# Patient Record
Sex: FEMALE | HISPANIC OR LATINO | URBAN - METROPOLITAN AREA
[De-identification: names, ages, dates, MRNs, and addresses within clinical notes are randomized per-mention and may not be internally consistent; named-entity substitution may affect disease eponyms.]

---

## 2023-06-06 ENCOUNTER — TELEPHONE ENCOUNTER (OUTPATIENT)
Dept: URBAN - METROPOLITAN AREA CLINIC 113 | Facility: CLINIC | Age: 43
End: 2023-06-06

## 2023-06-06 ENCOUNTER — LAB OUTSIDE AN ENCOUNTER (OUTPATIENT)
Dept: URBAN - METROPOLITAN AREA CLINIC 113 | Facility: CLINIC | Age: 43
End: 2023-06-06

## 2023-06-06 ENCOUNTER — OFFICE VISIT (OUTPATIENT)
Dept: URBAN - METROPOLITAN AREA CLINIC 113 | Facility: CLINIC | Age: 43
End: 2023-06-06
Payer: COMMERCIAL

## 2023-06-06 VITALS
TEMPERATURE: 97.3 F | BODY MASS INDEX: 28.03 KG/M2 | HEART RATE: 74 BPM | RESPIRATION RATE: 18 BRPM | SYSTOLIC BLOOD PRESSURE: 117 MMHG | WEIGHT: 158.2 LBS | DIASTOLIC BLOOD PRESSURE: 84 MMHG | HEIGHT: 63 IN

## 2023-06-06 DIAGNOSIS — R11.0 NAUSEA: ICD-10-CM

## 2023-06-06 DIAGNOSIS — R14.0 ABDOMINAL BLOATING: ICD-10-CM

## 2023-06-06 PROCEDURE — 99204 OFFICE O/P NEW MOD 45 MIN: CPT | Performed by: INTERNAL MEDICINE

## 2023-06-06 PROCEDURE — 99244 OFF/OP CNSLTJ NEW/EST MOD 40: CPT | Performed by: INTERNAL MEDICINE

## 2023-06-06 RX ORDER — HYOSCYAMINE SULFATE 0.12 MG/1
PLACE 1 TABLET UNDER THE TONGUE AND ALLOW TO DISSOLVE AS NEEDED FOR ABDOMINAL PAIN TABLET SUBLINGUAL
Qty: 45 | Refills: 1 | OUTPATIENT
Start: 2023-06-06 | End: 2023-06-26

## 2023-06-06 NOTE — HPI-TODAY'S VISIT:
41yo female referred by Dr. Dangelo Parker for evaluation of irritable bowel syndrome. A copy of this document is being forwarded to the referring provider. Within the last two months, she has experienced an exacerbation of excess gas and abdominal pain. She complains of lower abdominal pain and bloating, which seem to be worsened after eating. This leads to a feeling of fullness and nausea. She has not vomited. She reports no relief with bowel movements. She has a daily nonbloody stool, denying difficulty with constipation or diarrhea. She denies unintentional weight loss. She has not had any recent labs or abdominal imaging. She denies NSAID use. She consumes 4 beers 2-3 days per week. She denies tobacco use. There is no family history of GI malignancy. She has tried yogurt containing probiotics with little relief.

## 2023-06-12 LAB
IMMUNOGLOBULIN A: 195
TISSUE TRANSGLUTAMINASE AB, IGA: <1

## 2023-06-13 LAB
A/G RATIO: 1.5
ABSOLUTE BASOPHILS: 48
ABSOLUTE EOSINOPHILS: 120
ABSOLUTE LYMPHOCYTES: 1392
ABSOLUTE MONOCYTES: 456
ABSOLUTE NEUTROPHILS: 3984
ALBUMIN: 4.3
ALKALINE PHOSPHATASE: 58
ALT (SGPT): 13
AST (SGOT): 22
BASOPHILS: 0.8
BILIRUBIN, TOTAL: 0.5
BUN/CREATININE RATIO: (no result)
BUN: 12
CALCIUM: 8.8
CARBON DIOXIDE, TOTAL: 26
CHLORIDE: 103
CREATININE: 0.67
EGFR: 111
EOSINOPHILS: 2
GLOBULIN, TOTAL: 2.9
GLUCOSE: 85
HEMATOCRIT: 39
HEMOGLOBIN: 13
IMMUNOGLOBULIN A: 203
INTERPRETATION: (no result)
LIPASE: 10
LYMPHOCYTES: 23.2
MCH: 32.7
MCHC: 33.3
MCV: 98
MONOCYTES: 7.6
MPV: 12.1
NEUTROPHILS: 66.4
PLATELET COUNT: 176
POTASSIUM: 4
PROTEIN, TOTAL: 7.2
RDW: 11.7
RED BLOOD CELL COUNT: 3.98
SODIUM: 136
TISSUE TRANSGLUTAMINASE AB, IGA: <1
TSH W/REFLEX TO FT4: 1.07
WHITE BLOOD CELL COUNT: 6

## 2023-06-20 ENCOUNTER — TELEPHONE ENCOUNTER (OUTPATIENT)
Dept: URBAN - METROPOLITAN AREA CLINIC 113 | Facility: CLINIC | Age: 43
End: 2023-06-20

## 2023-06-20 ENCOUNTER — LAB OUTSIDE AN ENCOUNTER (OUTPATIENT)
Dept: URBAN - METROPOLITAN AREA CLINIC 113 | Facility: CLINIC | Age: 43
End: 2023-06-20

## 2023-08-01 ENCOUNTER — WEB ENCOUNTER (OUTPATIENT)
Dept: URBAN - METROPOLITAN AREA CLINIC 113 | Facility: CLINIC | Age: 43
End: 2023-08-01

## 2023-08-01 ENCOUNTER — OFFICE VISIT (OUTPATIENT)
Dept: URBAN - METROPOLITAN AREA CLINIC 113 | Facility: CLINIC | Age: 43
End: 2023-08-01
Payer: COMMERCIAL

## 2023-08-01 ENCOUNTER — DASHBOARD ENCOUNTERS (OUTPATIENT)
Age: 43
End: 2023-08-01

## 2023-08-01 VITALS
DIASTOLIC BLOOD PRESSURE: 75 MMHG | RESPIRATION RATE: 18 BRPM | BODY MASS INDEX: 27.43 KG/M2 | HEART RATE: 67 BPM | HEIGHT: 63 IN | SYSTOLIC BLOOD PRESSURE: 117 MMHG | WEIGHT: 154.8 LBS | TEMPERATURE: 97.5 F

## 2023-08-01 DIAGNOSIS — K76.0 HEPATIC STEATOSIS: ICD-10-CM

## 2023-08-01 DIAGNOSIS — R10.32 LEFT LOWER QUADRANT ABDOMINAL PAIN: ICD-10-CM

## 2023-08-01 DIAGNOSIS — R11.0 NAUSEA: ICD-10-CM

## 2023-08-01 DIAGNOSIS — N83.202 CYST OF LEFT OVARY: ICD-10-CM

## 2023-08-01 DIAGNOSIS — R10.11 RIGHT UPPER QUADRANT ABDOMINAL PAIN: ICD-10-CM

## 2023-08-01 PROCEDURE — 99214 OFFICE O/P EST MOD 30 MIN: CPT | Performed by: NURSE PRACTITIONER

## 2023-08-01 NOTE — HPI-TODAY'S VISIT:
42-year-old female presenting for follow-up of abdominal pain. She was seen in the office in June for evaluation of a recent exacerbation of postprandial abdominal pain, bloating, and nausea.  Routine labs and a CT of the abdomen and pelvis were planned.  She was encouraged a low FODMAP diet.  Hyoscyamine was provided to use as needed. Labs 6/6/2023:H/H 13/39, MCV 98, , WBC 6.0.  CMP unremarkable with normal LFTs.  TSH 1.07.  Lipase 10.  Negative celiac serologies. CT of the abdomen and pelvis with contrast 6/16/2023:3 small hypoattenuating lesions in the liver which may represent hepatic hemangiomas, minimal colonic diverticulosis, a 1 cm involuting left ovarian cyst, and nabothian cyst in the cervix. Subsequent MRI of the abdomen with and without contrast 6/28/2023:Nonenhancing T2 hyperintense lesions in the liver compatible with benign hepatic cyst, hepatic steatosis. A H pylori breath test was recommended following the previous visit, but has not been performed. Her symptoms are largely unchanged. She continues to experience intermittent exacerbations of right upper quadrant and left lower quadrant abdominal pain. This is typically worsened upon waking in the morning and is not necessarily related to meals. She reports associated nausea without vomiting. No reflux symptoms. She is following with GYN due to difficulty trying to conceive. She consumes 3 alcoholic beverages a few times per week.

## 2023-08-09 ENCOUNTER — TELEPHONE ENCOUNTER (OUTPATIENT)
Dept: URBAN - METROPOLITAN AREA CLINIC 113 | Facility: CLINIC | Age: 43
End: 2023-08-09

## 2023-08-09 LAB
H PYLORI BREATH TEST: DETECTED
H. PYLORI BREATH TEST: DETECTED
INTERPRETATION: DETECTED

## 2023-08-09 RX ORDER — CLARITHROMYCIN 500 MG/1
1 TABLET TABLET, FILM COATED ORAL
Qty: 28 TABLET | Refills: 0 | OUTPATIENT
Start: 2023-08-09 | End: 2023-08-23

## 2023-08-09 RX ORDER — METRONIDAZOLE 500 MG/1
1 TABLET TABLET ORAL TWICE A DAY
Qty: 28 TABLET | Refills: 0 | OUTPATIENT
Start: 2023-08-09 | End: 2023-08-23

## 2023-08-09 RX ORDER — BISMUTH SUBCITRATE POTASSIUM, METRONIDAZOLE, TETRACYCLINE HYDROCHLORIDE 140; 125; 125 MG/1; MG/1; MG/1
3 CAPSULES AFTER MEALS AND AT BEDTIME CAPSULE ORAL
Qty: 120 CAPSULE | Refills: 0 | OUTPATIENT
Start: 2023-08-09 | End: 2023-08-19

## 2023-08-09 RX ORDER — AMOXICILLIN 500 MG/1
2 CAPSULES CAPSULE ORAL
Qty: 56 CAPSULE | Refills: 0 | OUTPATIENT
Start: 2023-08-09 | End: 2023-08-23

## 2023-08-09 RX ORDER — OMEPRAZOLE 20 MG/1
1 CAPSULE 30 MINUTES BEFORE MEALS CAPSULE, DELAYED RELEASE ORAL TWICE A DAY
Qty: 30 | Refills: 2 | OUTPATIENT
Start: 2023-08-09